# Patient Record
Sex: FEMALE | Race: WHITE | Employment: FULL TIME | URBAN - METROPOLITAN AREA
[De-identification: names, ages, dates, MRNs, and addresses within clinical notes are randomized per-mention and may not be internally consistent; named-entity substitution may affect disease eponyms.]

---

## 2017-01-27 ENCOUNTER — HOSPITAL ENCOUNTER (OUTPATIENT)
Dept: MRI IMAGING | Facility: HOSPITAL | Age: 32
Discharge: HOME | End: 2017-01-27
Attending: SURGERY
Payer: COMMERCIAL

## 2017-01-27 DIAGNOSIS — M79.622 LEFT AXILLARY PAIN: ICD-10-CM

## 2017-01-27 PROCEDURE — A9575 INJ GADOTERATE MEGLUMI 0.1ML: HCPCS | Performed by: RADIOLOGY

## 2017-01-27 PROCEDURE — 71552 MRI CHEST W/O & W/DYE: CPT

## 2017-10-05 ENCOUNTER — HOSPITAL ENCOUNTER (OUTPATIENT)
Dept: ULTRASOUND IMAGING | Facility: HOSPITAL | Age: 32
Discharge: HOME | End: 2017-10-05
Attending: SURGERY
Payer: COMMERCIAL

## 2017-10-05 ENCOUNTER — HOSPITAL ENCOUNTER (OUTPATIENT)
Dept: MAMMOGRAPHY | Facility: HOSPITAL | Age: 32
Discharge: HOME | End: 2017-10-05
Attending: SURGERY
Payer: COMMERCIAL

## 2017-10-05 ENCOUNTER — NURSE NAVIGATOR ENCOUNTER (OUTPATIENT)
Dept: HEMATOLOGY/ONCOLOGY | Facility: HOSPITAL | Age: 32
End: 2017-10-05

## 2017-10-05 DIAGNOSIS — C50.912 BREAST CARCINOMA, FEMALE, LEFT (HCC): ICD-10-CM

## 2017-10-05 PROCEDURE — 76642 ULTRASOUND BREAST LIMITED: CPT | Performed by: SURGERY

## 2017-10-05 PROCEDURE — 77066 DX MAMMO INCL CAD BI: CPT | Performed by: SURGERY

## 2017-10-05 RX ORDER — CIPROFLOXACIN 500 MG/1
TABLET, FILM COATED ORAL
COMMUNITY

## 2017-10-05 NOTE — PROGRESS NOTES
Navigator called to patient with the assistance of  #[id number]. Discussed recommended breast biopsy with patient. Reviewed pertinent patient history, family history of cancer, and patient medications.     - Discussed with patient Radiology’s p of milk for 24-48 hours after the procedure. Discussed with patient that some soreness and bruising is normal after biopsy but that prolonged or increased pain and bruising should be reported to the ordering physician.    Reviewed results process with

## 2017-10-06 ENCOUNTER — HOSPITAL ENCOUNTER (OUTPATIENT)
Dept: MAMMOGRAPHY | Facility: HOSPITAL | Age: 32
Discharge: HOME | End: 2017-10-06
Attending: SURGERY
Payer: COMMERCIAL

## 2017-10-06 ENCOUNTER — HOSPITAL ENCOUNTER (OUTPATIENT)
Dept: ULTRASOUND IMAGING | Facility: HOSPITAL | Age: 32
Discharge: HOME | End: 2017-10-06
Attending: SURGERY
Payer: COMMERCIAL

## 2017-10-06 VITALS
HEART RATE: 91 BPM | RESPIRATION RATE: 16 BRPM | SYSTOLIC BLOOD PRESSURE: 129 MMHG | OXYGEN SATURATION: 100 % | DIASTOLIC BLOOD PRESSURE: 69 MMHG

## 2017-10-06 DIAGNOSIS — N61.0 MASTITIS IN FEMALE: ICD-10-CM

## 2017-10-06 DIAGNOSIS — R92.8 ABNORMAL MAMMOGRAM OF LEFT BREAST: ICD-10-CM

## 2017-10-06 DIAGNOSIS — N63.0 BREAST MASS: ICD-10-CM

## 2017-10-06 DIAGNOSIS — N63.20 LUMP OF BREAST, LEFT: ICD-10-CM

## 2017-10-06 PROCEDURE — 88305 TISSUE EXAM BY PATHOLOGIST: CPT | Performed by: SURGERY

## 2017-10-06 PROCEDURE — 87075 CULTR BACTERIA EXCEPT BLOOD: CPT | Performed by: PATHOLOGY

## 2017-10-06 PROCEDURE — 76942 ECHO GUIDE FOR BIOPSY: CPT | Performed by: SURGERY

## 2017-10-06 PROCEDURE — 88312 SPECIAL STAINS GROUP 1: CPT | Performed by: SURGERY

## 2017-10-06 PROCEDURE — 87102 FUNGUS ISOLATION CULTURE: CPT | Performed by: PATHOLOGY

## 2017-10-06 PROCEDURE — 87070 CULTURE OTHR SPECIMN AEROBIC: CPT | Performed by: PATHOLOGY

## 2017-10-06 PROCEDURE — 87077 CULTURE AEROBIC IDENTIFY: CPT | Performed by: PATHOLOGY

## 2017-10-06 PROCEDURE — 88173 CYTOPATH EVAL FNA REPORT: CPT | Performed by: SURGERY

## 2017-10-06 PROCEDURE — 87206 SMEAR FLUORESCENT/ACID STAI: CPT | Performed by: PATHOLOGY

## 2017-10-06 PROCEDURE — 88177 CYTP FNA EVAL EA ADDL: CPT | Performed by: SURGERY

## 2017-10-06 PROCEDURE — 87205 SMEAR GRAM STAIN: CPT | Performed by: PATHOLOGY

## 2017-10-06 PROCEDURE — 19083 BX BREAST 1ST LESION US IMAG: CPT | Performed by: SURGERY

## 2017-10-06 PROCEDURE — 87186 SC STD MICRODIL/AGAR DIL: CPT | Performed by: PATHOLOGY

## 2017-10-06 PROCEDURE — 88172 CYTP DX EVAL FNA 1ST EA SITE: CPT | Performed by: SURGERY

## 2017-10-06 PROCEDURE — 87158 CULTURE TYPING ADDED METHOD: CPT

## 2017-10-06 PROCEDURE — 10022 US VACUUM BREAST BX 1ST LES LT (CPT=19083): CPT | Performed by: SURGERY

## 2017-10-06 PROCEDURE — 87116 MYCOBACTERIA CULTURE: CPT | Performed by: PATHOLOGY

## 2017-10-06 PROCEDURE — 77065 DX MAMMO INCL CAD UNI: CPT | Performed by: SURGERY

## 2017-10-06 NOTE — IMAGING NOTE
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

## 2017-10-12 NOTE — PROGRESS NOTES
Navigator called to patient with assistance of  #[id number]. Message was left for patient to call Navigator back. Navigator received return phone call from patient. Conferenced on line with  #[id number].       Pt reports, \"One of the sti

## 2017-10-16 ENCOUNTER — HOSPITAL ENCOUNTER (OUTPATIENT)
Dept: GENERAL RADIOLOGY | Facility: HOSPITAL | Age: 32
Discharge: HOME | End: 2017-10-16
Attending: SURGERY
Payer: COMMERCIAL

## 2017-10-16 DIAGNOSIS — L92.9 GRANULOMATOSIS: ICD-10-CM

## 2017-10-16 PROCEDURE — 71020 XR CHEST PA + LAT CHEST (CPT=71020): CPT | Performed by: SURGERY

## 2021-12-26 ENCOUNTER — APPOINTMENT (OUTPATIENT)
Dept: GENERAL RADIOLOGY | Facility: HOSPITAL | Age: 36
End: 2021-12-26
Payer: COMMERCIAL

## 2021-12-26 ENCOUNTER — APPOINTMENT (OUTPATIENT)
Dept: GENERAL RADIOLOGY | Facility: HOSPITAL | Age: 36
End: 2021-12-26
Attending: STUDENT IN AN ORGANIZED HEALTH CARE EDUCATION/TRAINING PROGRAM
Payer: COMMERCIAL

## 2021-12-26 ENCOUNTER — HOSPITAL ENCOUNTER (EMERGENCY)
Facility: HOSPITAL | Age: 36
Discharge: HOME | End: 2021-12-26
Attending: STUDENT IN AN ORGANIZED HEALTH CARE EDUCATION/TRAINING PROGRAM
Payer: COMMERCIAL

## 2021-12-26 VITALS
DIASTOLIC BLOOD PRESSURE: 80 MMHG | HEART RATE: 82 BPM | WEIGHT: 210 LBS | SYSTOLIC BLOOD PRESSURE: 143 MMHG | OXYGEN SATURATION: 98 % | TEMPERATURE: 98 F | RESPIRATION RATE: 18 BRPM

## 2021-12-26 DIAGNOSIS — S30.0XXA CONTUSION OF BUTTOCK, INITIAL ENCOUNTER: Primary | ICD-10-CM

## 2021-12-26 DIAGNOSIS — S20.212A CONTUSION OF LEFT CHEST WALL, INITIAL ENCOUNTER: ICD-10-CM

## 2021-12-26 PROCEDURE — 73502 X-RAY EXAM HIP UNI 2-3 VIEWS: CPT | Performed by: STUDENT IN AN ORGANIZED HEALTH CARE EDUCATION/TRAINING PROGRAM

## 2021-12-26 PROCEDURE — 71101 X-RAY EXAM UNILAT RIBS/CHEST: CPT | Performed by: STUDENT IN AN ORGANIZED HEALTH CARE EDUCATION/TRAINING PROGRAM

## 2021-12-26 PROCEDURE — 99284 EMERGENCY DEPT VISIT MOD MDM: CPT

## 2021-12-26 RX ORDER — IBUPROFEN 600 MG/1
600 TABLET ORAL EVERY 8 HOURS PRN
Qty: 20 TABLET | Refills: 0 | Status: SHIPPED | OUTPATIENT
Start: 2021-12-26 | End: 2022-01-02

## 2021-12-26 RX ORDER — HYDROCODONE BITARTRATE AND ACETAMINOPHEN 5; 325 MG/1; MG/1
2 TABLET ORAL ONCE
Status: COMPLETED | OUTPATIENT
Start: 2021-12-26 | End: 2021-12-26

## 2021-12-26 NOTE — ED PROVIDER NOTES
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   States 1 week effort is normal.      Breath sounds: Normal breath sounds. Chest:      Chest wall: No tenderness. Comments: Mild tenderness to palpation along left lower lateral rib walls, no deformities  Abdominal:      General: There is no distension.       [place] [place] PELVIS 2 OR 3 VIEWS, LEFT (CPT=73502)    Result Date: 12/26/2021  CONCLUSION:  No radiographically visible acute osseous injury of the pelvis or left hip.     Dictated by (CST): Alexander Bryan MD on 12/26/2021 at 6:34 PM     Finalized by (CST): Sheldon Hurtado

## (undated) NOTE — LETTER
5 [facility], [place], [address] [place] [address]  1.  Por [place]  Dr Elliott's y morgan(s) Asistente(s), a llevar a cabo la siguiente operación y/o procedimient 8. Entiendo que el(la) doctor(a) y [place] médicos asistentes no son empleados o agentes del hospital, sino profesionales médicos independientes a quienes el hospital ha permitido usar car[place] instalaciones para Irineo How y tratamiento de carolina Vanam[place]isa.   9. Vikash Rubins tratarniento propuesto y alternativas al tratamiento propuesto, y he respondido a las preguntas del(la) paciente(My signature below affirms that prior to the time of the procedure, I have explained to the patient and/or his/her guardian, therisks and benef

## (undated) NOTE — LETTER
[address] [place] [address]  Authorization for Surgical Operation or Procedure  Date: ______________       Time: _______________  1.  I hereby authorize Dr. Oumar Loo , my physician and the assistant, to perform the following operation and/o 5. I consent to the photographing of the operations or procedures to be performed for the purposes of advancing medicine, science, and/or education, provided my identity is not revealed.  If the procedure has been videotaped, the physician/surgeon will obta risks and benefits involved in the proposed treatment and any reasonable alternative to the proposed treatment. I have also explained the risks and benefits involved in the refusal of the proposed treatment and have answered the patient's questions.  If I h